# Patient Record
Sex: FEMALE | Race: WHITE | NOT HISPANIC OR LATINO | Employment: UNEMPLOYED | URBAN - NONMETROPOLITAN AREA
[De-identification: names, ages, dates, MRNs, and addresses within clinical notes are randomized per-mention and may not be internally consistent; named-entity substitution may affect disease eponyms.]

---

## 2017-01-19 DIAGNOSIS — Z12.31 ENCOUNTER FOR SCREENING MAMMOGRAM FOR MALIGNANT NEOPLASM OF BREAST: Primary | ICD-10-CM

## 2017-01-27 ENCOUNTER — TRANSCRIBE ORDERS (OUTPATIENT)
Dept: PODIATRY | Facility: CLINIC | Age: 62
End: 2017-01-27

## 2017-01-27 DIAGNOSIS — B35.1 ONYCHOMYCOSIS: ICD-10-CM

## 2017-01-27 DIAGNOSIS — L60.0 INGROWN TOENAIL: Primary | ICD-10-CM

## 2017-02-20 ENCOUNTER — OFFICE VISIT (OUTPATIENT)
Dept: PODIATRY | Facility: CLINIC | Age: 62
End: 2017-02-20

## 2017-02-20 VITALS — BODY MASS INDEX: 27.31 KG/M2 | HEIGHT: 64 IN | WEIGHT: 160 LBS

## 2017-02-20 DIAGNOSIS — B35.1 ONYCHOMYCOSIS: Primary | ICD-10-CM

## 2017-02-20 PROCEDURE — 99203 OFFICE O/P NEW LOW 30 MIN: CPT | Performed by: PODIATRIST

## 2017-02-20 NOTE — PROGRESS NOTES
Seema Cardona  1955  61 y.o. female     Patient presents today with a thick, discolored nail that she has difficulty cutting; left great toenail.    2/20/2017  Chief Complaint   Patient presents with   • Left Foot - Nail Problem           History of Present Illness    Patient presents to clinic today with chief complaint of thick and discolored left great toenail.  States that a while back she dropped something heavy on it.  A hematoma developed which is now gone.  Since that time the nail has been thickened and discolored.  Is currently not painful.  It is difficult for her to trim.  She has no other pedal complaints.         Past Medical History   Diagnosis Date   • Abnormal chest x-ray    • Abnormal x-ray of lungs with single pulmonary nodule    • Acute sinusitis    • Acute upper respiratory infection    • Aptyalism    • Arthritis      OF HIP & MULTIPLE JOINTS   • Asthma    • Atrophic vaginitis    • Benign polyp of large intestine    • Candidiasis of vagina    • Cervical disc disorder with radiculopathy    • Chronic vaginitis    • Cough    • Degeneration of lumbar intervertebral disc    • Dehydration    • Dysfunction of eustachian tube    • Dyspnea      AT REST   • Estefanía Barr virus infection      serostatus positive      • Foot pain    • Generalized abdominal pain    • GERD (gastroesophageal reflux disease)    • Hiatal hernia    • Hormone replacement therapy    • Hyperlipidemia    • Hypertension    • Hypokalemia    • Intolerant of cold    • Itching    • Loss of appetite    • Multiple joint pain    • Nasal lesion      MUCOSA   • Neck pain    • Palpitations    • Plantar fasciitis    • Postcholecystectomy diarrhea      cholestipol      • Primary fibromyalgia syndrome    • Shoulder pain    • Sialoadenitis    • Sinus problem    • Spine pain, cervical    • Subacute and chronic vaginitis    • Thoracic spine pain    • Tietze's disease    • Unspecified visual disturbance    • Vaginitis    • Vertigo    • Vitamin D  deficiency    • Vulvovaginitis          Past Surgical History   Procedure Laterality Date   • Cholecystectomy     • Injection of medication  09/17/2014     METHYLPREDNISONE, 80MG   • Sinus surgery     • Total abdominal hysterectomy with salpingo oophorectomy           Family History   Problem Relation Age of Onset   • Cancer Mother    • Prostate cancer Father    • Heart disease Maternal Grandmother    • Diabetes Other          Social History     Social History   • Marital status:      Spouse name: N/A   • Number of children: N/A   • Years of education: N/A     Occupational History   • Not on file.     Social History Main Topics   • Smoking status: Former Smoker   • Smokeless tobacco: Never Used   • Alcohol use No   • Drug use: Not on file   • Sexual activity: Not on file     Other Topics Concern   • Not on file     Social History Narrative         Current Outpatient Prescriptions   Medication Sig Dispense Refill   • ALPRAZolam (XANAX) 1 MG tablet Take 1-2 tablets 15 prior to procedure 2 tablet 0   • atorvastatin (LIPITOR) 10 MG tablet Take 1 tablet by mouth Daily. 30 tablet 5   • Cholecalciferol (VITAMIN D-3 PO) Take 2,000 Units by mouth Daily. CAPSULES     • estradiol (VAGIFEM) 10 MCG tablet vaginal tablet Insert 1 tablet into the vagina Every Night. INSERT 1 TAB EVERY OTHER DAY AT BEDTIME (AS DIRECTED)     • fluconazole (DIFLUCAN) 150 MG tablet Take 150 mg by mouth 1 (One) Time.     • mupirocin (BACTROBAN NASAL) 2 % nasal ointment 1 application into each nostril 2 (Two) Times a Day.     • nebivolol (BYSTOLIC) 10 MG tablet Take 1 tablet by mouth Daily. 30 tablet 5   • Omega-3 Fatty Acids (FISH OIL PO) Take 150-450 mg by mouth Daily. WITH KRILL DELAYED RELEASE CAPSULE     • omeprazole OTC (PRILOSEC OTC) 20 MG EC tablet Take 20 mg by mouth Daily.     • Probiotic Product (PROBIOTIC PO) Take 1 capsule by mouth Daily. 3 BILLION CELL CAPSULE     • vitamin D (ERGOCALCIFEROL) 82231 UNITS capsule capsule Take 1  "capsule by mouth 2 (Two) Times a Week. 8 capsule 5     Current Facility-Administered Medications   Medication Dose Route Frequency Provider Last Rate Last Dose   • cyanocobalamin injection 1,000 mcg  1,000 mcg Intramuscular Q28 Days Luciana Go MD   1,000 mcg at 12/12/16 1115         OBJECTIVE    Visit Vitals   • Ht 64\" (162.6 cm)   • Wt 160 lb (72.6 kg)   • BMI 27.46 kg/m2         Review of Systems   Constitutional: Negative for chills and fever.   Cardiovascular: Negative for chest pain.   Gastrointestinal: Negative for constipation, diarrhea, nausea and vomiting.   Skin: Negative for wound. discolored left hallux nail  Musculoskeletal: negative foot pain      Constitutional: well developed, well nourished    HEENT: Normocephalic and atraumatic, normal hearing    Respiratory: Non labored respirations noted    Cardiovascular:    DP/PT pulses palpable    CFT brisk  to all digits  Skin temp is warm to warm from proximal tibia to distal digits  Pedal hair growth present.   No erythema or edema noted     Musculoskeletal:  Muscle strength is 5/5 for all muscle groups tested   ROM of the 1st MTP is full without pain or crepitus  ROM of the MTJ is full without pain or crepitus    ROM of the STJ is full without pain or crepitus    ROM of the ankle joint is full without pain or crepitus    No pain on palpation noted    Rectus foot type     Dermatological:   Nails 2-5 are within normal limits for length and thickness left hallux nail is thickened and discolored.    Skin is warm, dry and intact    Webspaces 1-4 bilateral are clean, dry and intact.   No subcutaneous nodules or masses noted    No open wounds noted     Neurological:   Protective sensation intact    Sensation intact to light touch    DTR intact    Psychiatric: A&O x 3 with normal mood and affect. NAD.             Procedures        ASSESSMENT AND PLAN    Seema was seen today for nail problem.    Diagnoses and all orders for this visit:    Onychomycosis    - " Comprehensive foot and ankle exam performed  - Diagnosis, prevention and treatment of fungal nails was discussed with the patient including nail biopsy to confirm diagnosis, oral antifungal medication and manual debridement.  Patient declined biopsy today.  States that she'll wait until her nail grows out longer she is discolored for the biopsy.  - Advised patient to try over-the-counter topical medications.  - All questions were answered and the patient is in agreement with the current treatment plan.  - RTC prn            This document has been electronically signed by Bg Shen DPM on February 20, 2017 4:04 PM     2/20/2017  4:04 PM

## 2021-03-03 ENCOUNTER — IMMUNIZATION (OUTPATIENT)
Dept: VACCINE CLINIC | Facility: HOSPITAL | Age: 66
End: 2021-03-03

## 2021-03-03 PROCEDURE — 91300 HC SARSCOV02 VAC 30MCG/0.3ML IM: CPT | Performed by: NURSE PRACTITIONER

## 2021-03-03 PROCEDURE — 0001A: CPT | Performed by: NURSE PRACTITIONER

## 2021-03-24 ENCOUNTER — IMMUNIZATION (OUTPATIENT)
Dept: VACCINE CLINIC | Facility: HOSPITAL | Age: 66
End: 2021-03-24

## 2021-03-24 PROCEDURE — 91300 HC SARSCOV02 VAC 30MCG/0.3ML IM: CPT | Performed by: THORACIC SURGERY (CARDIOTHORACIC VASCULAR SURGERY)

## 2021-03-24 PROCEDURE — 0002A: CPT | Performed by: THORACIC SURGERY (CARDIOTHORACIC VASCULAR SURGERY)
